# Patient Record
Sex: FEMALE | Race: WHITE | NOT HISPANIC OR LATINO | URBAN - METROPOLITAN AREA
[De-identification: names, ages, dates, MRNs, and addresses within clinical notes are randomized per-mention and may not be internally consistent; named-entity substitution may affect disease eponyms.]

---

## 2017-04-19 ENCOUNTER — OFFICE VISIT - RIVER FALLS (OUTPATIENT)
Dept: FAMILY MEDICINE | Facility: CLINIC | Age: 67
End: 2017-04-19

## 2017-04-19 ASSESSMENT — MIFFLIN-ST. JEOR: SCORE: 1450.75

## 2017-06-02 ENCOUNTER — COMMUNICATION - RIVER FALLS (OUTPATIENT)
Dept: FAMILY MEDICINE | Facility: CLINIC | Age: 67
End: 2017-06-02

## 2017-06-02 ENCOUNTER — OFFICE VISIT - RIVER FALLS (OUTPATIENT)
Dept: FAMILY MEDICINE | Facility: CLINIC | Age: 67
End: 2017-06-02

## 2017-06-02 ASSESSMENT — MIFFLIN-ST. JEOR: SCORE: 1455.29

## 2017-09-18 ENCOUNTER — AMBULATORY - RIVER FALLS (OUTPATIENT)
Dept: FAMILY MEDICINE | Facility: CLINIC | Age: 67
End: 2017-09-18

## 2018-09-20 ENCOUNTER — AMBULATORY - RIVER FALLS (OUTPATIENT)
Dept: FAMILY MEDICINE | Facility: CLINIC | Age: 68
End: 2018-09-20

## 2018-10-15 ENCOUNTER — OFFICE VISIT - RIVER FALLS (OUTPATIENT)
Dept: FAMILY MEDICINE | Facility: CLINIC | Age: 68
End: 2018-10-15

## 2018-10-29 ENCOUNTER — OFFICE VISIT - RIVER FALLS (OUTPATIENT)
Dept: FAMILY MEDICINE | Facility: CLINIC | Age: 68
End: 2018-10-29

## 2018-10-29 ASSESSMENT — MIFFLIN-ST. JEOR: SCORE: 1414.47

## 2018-11-27 ENCOUNTER — AMBULATORY - RIVER FALLS (OUTPATIENT)
Dept: FAMILY MEDICINE | Facility: CLINIC | Age: 68
End: 2018-11-27

## 2018-11-28 LAB
CHOLEST SERPL-MCNC: 202 MG/DL
CHOLEST/HDLC SERPL: 4.3 {RATIO}
HDLC SERPL-MCNC: 47 MG/DL
LDLC SERPL CALC-MCNC: 123 MG/DL
NONHDLC SERPL-MCNC: 155 MG/DL
TRIGL SERPL-MCNC: 197 MG/DL

## 2019-05-09 ENCOUNTER — OFFICE VISIT - RIVER FALLS (OUTPATIENT)
Dept: FAMILY MEDICINE | Facility: CLINIC | Age: 69
End: 2019-05-09

## 2019-05-09 ASSESSMENT — MIFFLIN-ST. JEOR: SCORE: 1419

## 2019-09-30 ENCOUNTER — AMBULATORY - RIVER FALLS (OUTPATIENT)
Dept: FAMILY MEDICINE | Facility: CLINIC | Age: 69
End: 2019-09-30

## 2020-10-14 ENCOUNTER — OFFICE VISIT - RIVER FALLS (OUTPATIENT)
Dept: FAMILY MEDICINE | Facility: CLINIC | Age: 70
End: 2020-10-14

## 2021-05-25 ENCOUNTER — RECORDS - HEALTHEAST (OUTPATIENT)
Dept: ADMINISTRATIVE | Facility: CLINIC | Age: 71
End: 2021-05-25

## 2022-02-11 VITALS
HEART RATE: 86 BPM | HEART RATE: 71 BPM | OXYGEN SATURATION: 98 % | WEIGHT: 205 LBS | TEMPERATURE: 98.4 F | SYSTOLIC BLOOD PRESSURE: 118 MMHG | DIASTOLIC BLOOD PRESSURE: 80 MMHG | HEIGHT: 65 IN | SYSTOLIC BLOOD PRESSURE: 122 MMHG | BODY MASS INDEX: 34.32 KG/M2 | DIASTOLIC BLOOD PRESSURE: 81 MMHG | HEIGHT: 65 IN | BODY MASS INDEX: 34.16 KG/M2 | WEIGHT: 206 LBS

## 2022-02-11 VITALS
DIASTOLIC BLOOD PRESSURE: 88 MMHG | OXYGEN SATURATION: 96 % | HEIGHT: 65 IN | HEART RATE: 76 BPM | SYSTOLIC BLOOD PRESSURE: 124 MMHG | BODY MASS INDEX: 32.99 KG/M2 | TEMPERATURE: 98.9 F | WEIGHT: 198 LBS

## 2022-02-11 VITALS
BODY MASS INDEX: 35.71 KG/M2 | WEIGHT: 214.6 LBS | DIASTOLIC BLOOD PRESSURE: 64 MMHG | HEART RATE: 68 BPM | SYSTOLIC BLOOD PRESSURE: 147 MMHG

## 2022-02-11 VITALS
HEART RATE: 82 BPM | WEIGHT: 197 LBS | BODY MASS INDEX: 32.82 KG/M2 | HEIGHT: 65 IN | TEMPERATURE: 98.6 F | OXYGEN SATURATION: 95 %

## 2022-02-16 NOTE — PROGRESS NOTES
Patient:   MYRIAM ANDERSEN            MRN: [id number]            FIN: [id number]               Age:   66 years     Sex:  Female     :  1950   Associated Diagnoses:   Breast cancer   Author:   Frankie Matamoros MD      Visit Information      Date of Service: 2017 08:47 am  Performing Location: [facility]  Encounter#: [id number]      Primary Care Provider (PCP):  Joaquin Fay MD    NPI# [id number]      Referring Provider:  Joaquin Fay MD    NPI# [id number]      Chief Complaint   2017 9:03 AM CDT     Patient is here for pap, pelvic, and chest wall exam.      Interval History   The patient is in today for chest wall examination.  She has DCIS and had bilateral mastectomies.  She has been advised to have semiannual chest wall examinations and is due for Pap smear.  She has had a bit of spotting with intercourse but never otherwise.  She has no other concerns.  She is up-to-date on her general screening with her colonoscopy last year and had TSH check and lipid profile.  Blood pressure has been normal on checks in the past.  Review of systems is negative.      Review of Systems   Review  of systems is negative except as documented under interval history.      Health Status   Allergies:    Allergic Reactions (Selected)  No known allergies   Medications:  (Selected)   Prescriptions  Prescribed  Imitrex 100 mg oral tablet: 1 tab(s) ( 100 mg ), po, once, # 9 tab(s), 1 Refill(s), Type: Soft Stop, Pharmacy: [facility], 1 tab(s) po once  Prevacid 30 mg oral delayed release capsule: 2 cap(s) ( 60 mg ), po, daily, # 60 EA, 11 Refill(s), Pharmacy: [facility], 2 cap(s) po daily  clindamycin 1% topical lotion: 1 haylie, TOP, BID, # 60 mL, 1 Refill(s), Type: Maintenance, Pharmacy: [facility], 1 haylie top bid  triamcinolone 0.1% topical cream: 1 haylie, TOP, TID, # 60 g, 2 Refill(s), Type: Maintenance, Pharmacy: [id number], 1 haylie top tid   Problem list:    All Problems  Cataracts, bilateral /  ICD-9-.9 / Confirmed  Kidney Stones / ICD-9-.0 / Confirmed  GERD (Gastroesophageal Reflux Disease) / ICD-9-.81 / Confirmed  Migraine / SNOMED CT 1670987100 / Confirmed  DCIS (Ductal Carcinoma in Situ) of Breast / SNOMED CT 760746432 / Confirmed  Obesity / SNOMED CT 1987633606 / Probable  Inactive: Hoarseness / ICD-9-.49  Inactive: Migraine / ICD-9-.90  Inactive: Erythema nodosum NOS / ICD-9-.2  Inactive: Insomnia / ICD-9-.52  Inactive: Hematuria / ICD-9-.70  Inactive: Obesity / ICD-9-.00  Inactive: Cervical Polyp / ICD-9-.7  Resolved: Pregnancy / SNOMED CT 495517522  Resolved: Pregnancy / SNOMED CT 114229801  Canceled: Reflux / ICD-9-.81  Canceled: gerd  Canceled: GERD  Canceled: Obesity / ICD-9-.00      Histories   Past Medical History:    Active  Cataracts, bilateral (366.9)  Kidney Stones (592.0)  GERD (Gastroesophageal Reflux Disease) (530.81)  Migraine (8506840034)  DCIS (Ductal Carcinoma in Situ) of Breast (775122384)  Resolved  Pregnancy (998854949):  Resolved on 1981 at 30 years.  Pregnancy (605127222):  Resolved on 1982 at 31 years.   Family History:    Prostate carcinoma  Brother (Howard)  Diabetes mellitus  Sister  CA - Cancer  Mother ()  Father ()  Helicobacter pylori  Mother ()  Colon polyp.  Brother (Howard)  Brother (Lui)     Procedure history:    Esophagogastroduodenoscopy and biopsy (3190284009) on 2016 at 66 Years.  Colonoscopy on 2016 at 66 Years.  Comments:  2016 8:26 AM - Whit Barcenas  Sedation:  Fentanyl, midazolam, Benadryl in divided dosages.   Impression: History of hemorrhoids with intermittent bleeding, currently inactive today.  Sigmoid diverticulosis, mild to moderate but asymptomatic.  Diminutive polyps at 70 cm, removed by cold biopsy technique.  About 8 mm polyp in the cecum, removed by hot snare techniqure.  Advanced polyp, 12 to 14 mm, of the ascending colon removed by  hot snare technique.  Family history of  polyps.    Recommendation:  Repeat in 3 years.  Mastectomy (8397913381) on 7/21/2014 at 64 Years.  Comments:  7/14/2014 8:52 BANDAR - Joaquin Fay MD  Double Mastectomy with lymph node dissection  Dr. Camarillo  [facility]  Left Breast Lumpectomy on 6/19/2014 at 64 Years.  Comments:  6/9/2014 9:45 BANDAR - Joaquin Fay MD, Dr.  [facility]  Bilateral Breast Reconstruction in 2014 at 64 Years.  Comments:  11/4/2014 9:12 BANDAR - Joaquin Fay MD, Dr.  11/13/2014  [facility] [place], [place]  DEXA - Dual energy X-ray photon absorptiometry (770504361) on 5/20/2014 at 63 Years.  Comments:  5/28/2014 10:05 AM - Favio SOLANO, Marely  Osteopenia-repeat in 2 years  Right Shoulder Arthroscopy and Rotator Cuff Repair in 2013 at 63 Years.  Right Knee Arthroscopy in 2012 at 62 Years.  Colonoscopy and extirpation of lesion of colon (950334978) on 11/30/2011 at 61 Years.  Comments:  12/7/2011 7:49 PM - Jesse Tim MD  No dysplasia/malignancy    12/5/2011 10:39 AM - Tram Soto  Rectal polyp; hyperplastic changes, benign mucosal lymphoid aggregates    11/30/2011 10:02 AM - Jesse Tim MD  3 very small rectal polyps;   diverticulosis of the lower colon.  Normal TI  Follow up in 5 years  Cataract extraction and insertion of intraocular lens (8675959021) in 2011 at 61 Years.  Colonoscopy and extirpation of lesion of colon (921584915) on 1/15/2008 at 57 Years.  Comments:  11/30/2011 9:22 AM - Jesse Tim MD  1 polyp removed;  diverticulosis   D & C, and polypectomy on 11/3/2006 at 56 Years.  Comments:  7/16/2010 10:20 AM - Kaya Baptiste  For post menopausal bleeding.  Arthroscopy of knee (531346905) in the month of 9/2006 at 56 Years.  Comments:  5/28/2013 4:39 PM - Viky Trujillo  Left  Flexible sigmoidoscopy (42916584) on 2/15/2002 at 51 Years.  Comments:  5/28/2013 4:43 PM - Viky Trujillo  Normal to 40-cm.  Ganglion cyst (601804301).  Colonoscopy (700985133).  Comments:  11/15/2011 1:16  PM - Sumeet FERMIN, Joaquin  four colonoscopies total   Social History:        Alcohol Assessment: Current            Very seldom      Tobacco Assessment: Denies Tobacco Use            Never      Substance Abuse Assessment: Denies Substance Abuse            Never      Employment and Education Assessment            Retired      Home and Environment Assessment            Marital status: .  Spouse/Partner name: Gopal.      Nutrition and Health Assessment            Type of diet: Regular.      Exercise and Physical Activity Assessment: Does not exercise      Sexual Assessment            Sexually active: Yes.  Number of current partners: 1.  Sexual orientation: Heterosexual.  Uses condoms: No.        Physical Examination   Vital Signs   6/2/2017 9:03 AM CDT Peripheral Pulse Rate 71 bpm    HR Method Electronic    Systolic Blood Pressure 118 mmHg    Diastolic Blood Pressure 81 mmHg    Mean Arterial Pressure 93 mmHg    BP Site Right arm    BP Method Electronic      Gastrointestinal:       Abdomen: Abdomen is soft and nontender without masses..    Gynecologic:  The vagina appears healthy; although, minimally atrophic.  There is perhaps a bit of redness from the vaginal epithelium but no blood from the cervix.  Bimanual examination finds tiny uterus with no pelvic masses or tenderness..         Breast: Surgically altered ( Chest wall examination finds no skin changes or lumps.  She has bilateral implants with no abnormalities noted.   ).       Impression and Plan   Diagnosis     Breast cancer ([id number] C50.919).     Minor spotting from atrophic change.  No evidence of disease otherwise..     Plan:  The patient was reassured.  She will return to us p.r.n. and continue with her required six month chest wall examination..    Patient Instructions:       Counseled: Patient, Regarding diagnosis, Regarding treatment, Verbalized understanding.

## 2022-02-16 NOTE — TELEPHONE ENCOUNTER
---------------------  From: Bernadine Moser CMA   To: Joaquin Fay MD;     Sent: 7/8/2019 8:49:18 AM CDT  Subject: CT order request     Pt called to request that Pulmonary CT order be sent to [facility] [place]  so that she can schedule for Aug.

## 2022-02-16 NOTE — NURSING NOTE
Pt called to say that med given for pain by BRITTA in Oct.. really works well and she wanted it noted in her chart... upon chart review, the med is celebrex

## 2022-02-16 NOTE — TELEPHONE ENCOUNTER
---------------------  From: Bernadine Moser CMA   To: Joaquin Fay MD;     Sent: 1/2/2019 10:36:49 AM CST  Subject: General Message     Phone Message    PCP:   BR[facility]TA      Time of Call:  _       Person Calling:  Monik  Phone number:  [phone]    Returned call at: _    Note:   Pt called to say that she was in at the end of Oct for cough... she is now in [place] and the cough has returned and is coughing up green phlegm.  She has been taking Mucinex without relief.. she is wondering if she can have a rx sent to [facility]    Last office visit and reason:  _    Transferred to: BRITTA---------------------  From: Joaquin Fay MD   To: Bernadine Moser CMA;     Sent: 1/2/2019 12:22:18 PM CST  Subject: RE: General Message     eRx sentPt contacted that rx is ready for  at the pharmacy

## 2022-02-16 NOTE — TELEPHONE ENCOUNTER
MYRIAM ANDERSEN  : 1950  PHONE NOTE  The patient was given the results of her chest CT done on 2019.  There was a new 2 mm pulmonary nodule on the left upper lobe.  The nodule she previously had on the right lower lobe had apparently resolved.  I told her that she didn t need to do any further imaging.  However, if she wanted to follow this for one more year that would be reasonable.  D:  2019  T:  2019  Joaquin Fay MD/michele

## 2022-02-16 NOTE — NURSING NOTE
Comprehensive Intake Entered On:  10/14/2020 3:21 PM CDT    Performed On:  10/14/2020 3:14 PM CDT by Talia Gonzalez CMA               Summary   Chief Complaint :   PREOP: DOS 11/5/2020    Menstrual Status :   Postmenopausal   Weight Measured :   214.6 lb(Converted to: 214 lb 10 oz, 97.341 kg)    Systolic Blood Pressure :   147 mmHg (HI)    Diastolic Blood Pressure :   64 mmHg   Mean Arterial Pressure :   92 mmHg   Peripheral Pulse Rate :   68 bpm   BP Method :   Electronic   HR Method :   Electronic   Race :      Languages :   English   Ethnicity :   Not  or    Talia Gonzalez CMA - 10/14/2020 3:14 PM CDT   Health Status   Allergies Verified? :   Yes   Medication History Verified? :   Yes   Immunizations Current :   Yes   Medical History Verified? :   Yes   Tobacco Use? :   Never smoker   Talia Gonzalez CMA - 10/14/2020 3:14 PM CDT   Consents   Consent for Immunization Exchange :   Consent Granted   Consent for Immunizations to Providers :   Consent Granted   Talia Gonzalez CMA - 10/14/2020 3:14 PM CDT   Meds / Allergies   (As Of: 10/14/2020 3:21:19 PM CDT)   Allergies (Active)   No known allergies  Estimated Onset Date:   Unspecified ; Created By:   Kaya Baptiste; Reaction Status:   Active ; Category:   Drug ; Substance:   No known allergies ; Type:   Allergy ; Updated By:   Kaya Baptiste; Reviewed Date:   5/9/2019 8:53 AM CDT        Medication List   (As Of: 10/14/2020 3:21:19 PM CDT)   Prescription/Discharge Order    celecoxib  :   celecoxib ; Status:   Prescribed ; Ordered As Mnemonic:   celecoxib 200 mg oral capsule ; Simple Display Line:   1 cap(s), Oral, daily, PRN: AS NEEDED FOR PAIN, 90 cap(s), 1 Refill(s) ; Ordering Provider:   Joaquin Fay MD; Catalog Code:   celecoxib ; Order Dt/Tm:   6/16/2020 11:27:19 AM CDT            Home Meds    bifidobacterium-lactobacillus  :   bifidobacterium-lactobacillus ; Status:   Documented ; Ordered As Mnemonic:   "[employer] ; Simple Display  Line:   Oral, daily, 0 Refill(s) ; Catalog Code:   bifidobacterium-lactobacillus ; Order Dt/Tm:   10/14/2020 3:20:24 PM CDT          cholecalciferol  :   cholecalciferol ; Status:   Documented ; Ordered As Mnemonic:   Vitamin D3 2000 intl units oral tablet ; Simple Display Line:   50 mcg, Oral, daily, 0 Refill(s) ; Catalog Code:   cholecalciferol ; Order Dt/Tm:   10/14/2020 3:18:40 PM CDT          loratadine  :   loratadine ; Status:   Documented ; Ordered As Mnemonic:   loratadine 10 mg oral tablet ; Simple Display Line:   10 mg, 1 tab(s), Oral, daily, 0 Refill(s) ; Catalog Code:   loratadine ; Order Dt/Tm:   10/14/2020 3:19:25 PM CDT          methylcellulose  :   methylcellulose ; Status:   Documented ; Ordered As Mnemonic:   Citrucel 500 mg oral tablet ; Simple Display Line:   1,000 mg, 2 tab(s), Oral, daily, 0 Refill(s) ; Catalog Code:   methylcellulose ; Order Dt/Tm:   10/14/2020 3:19:57 PM CDT          omega-3 polyunsaturated fatty acids  :   omega-3 polyunsaturated fatty acids ; Status:   Documented ; Ordered As Mnemonic:   Fish Oil ; Simple Display Line:   Oral, 0 Refill(s) ; Catalog Code:   omega-3 polyunsaturated fatty acids ; Order Dt/Tm:   10/14/2020 3:19:05 PM CDT          pantoprazole  :   pantoprazole ; Status:   Documented ; Ordered As Mnemonic:   pantoprazole 40 mg oral delayed release tablet ; Simple Display Line:   40 mg, 1 tab(s), PO, Daily, 90 tab(s), 0 Refill(s) ; Catalog Code:   pantoprazole ; Order Dt/Tm:   10/29/2018 10:42:10 AM CDT            ID Risk Screen   Recent Travel History :   No recent travel   Family Member Travel History :   No recent travel   Other Exposure to Infectious Disease :   Unknown   Talia Gonzalez CMA - 10/14/2020 3:14 PM CDT

## 2022-02-16 NOTE — PROGRESS NOTES
Patient:   MYRIAM ANDERSEN            MRN: [id number]            FIN: [id number]               Age:   68 years     Sex:  Female     :  1950   Associated Diagnoses:   Lower respiratory infection   Author:   Joaquin Fay MD      Impression and Plan   Diagnosis     Lower respiratory infection ([id number] J22).     Course:  Worsening.    Orders     Orders   Charges (Evaluation and Management):  13144 office outpatient visit 15 minutes (Charge) (Order): Quantity: 1, Lower respiratory infection.     Orders (Selected)   Prescriptions  Prescribed  azithromycin 500 mg oral tablet: = 1 tab(s) ( 500 mg ), PO, Daily, # 7 tab(s), 0 Refill(s), Type: Maintenance, Pharmacy: [facility], 1 tab(s) Oral daily,x7 day(s)  benzonatate 200 mg oral capsule: = 1 cap(s) ( 200 mg ), PO, TID, # 21 cap(s), 0 Refill(s), Type: Maintenance, Pharmacy: [facility], 1 cap(s) Oral tid,x7 day(s).        Visit Information      Date of Service: 2019 08:47 am  Performing Location: [facility]  Encounter#: [id number]      Primary Care Provider (PCP):  Joaquin Fay MD    NPI# [id number]   Visit type:  New symptom.    Accompanied by:  No one.    Source of history:  Self.    History limitation:  None.       Chief Complaint   Chief complaint discussed and confirmed correct.     2019 8:50 AM CDT     pt here for cough x 1 week        History of Present Illness             The patient presents with cough.  The cough is described as paroxysmal and productive yellow sputum.  The severity of the cough is severe.  The cough is worsening.  The cough has lasted for 1 week(s).  The context of the cough: occurred in association with illness.  There are no modifying factors.  Associated symptoms consist of none.        Review of Systems   Constitutional:  Negative.    Eye:  Negative.    Ear/Nose/Mouth/Throat:  Negative.    Respiratory:  Cough.    Cardiovascular:  Negative.    Gastrointestinal:  Negative.    Genitourinary:  Negative.     Hematology/Lymphatics:  Negative.    Endocrine:  Negative.    Immunologic:  Negative.    Musculoskeletal:  Negative.    Integumentary:  Negative.    Neurologic:  Negative.    Psychiatric:  Negative.    All other systems reviewed and negative      Health Status   Allergies:    Allergic Reactions (Selected)  No known allergies   Medications:  (Selected)   Prescriptions  Prescribed  Imitrex 100 mg oral tablet: 1 tab(s) ( 100 mg ), po, once, # 9 tab(s), 1 Refill(s), Type: Soft Stop, Pharmacy: [facility], 1 tab(s) po once  azithromycin 500 mg oral tablet: = 1 tab(s) ( 500 mg ), PO, Daily, # 7 tab(s), 0 Refill(s), Type: Maintenance, Pharmacy: [facility], 1 tab(s) Oral daily,x7 day(s)  benzonatate 200 mg oral capsule: = 1 cap(s) ( 200 mg ), PO, TID, # 21 cap(s), 0 Refill(s), Type: Maintenance, Pharmacy: [facility], 1 cap(s) Oral tid,x7 day(s)  Documented Medications  Documented  latanoprost ophthalmic: qpm, 0 Refill(s), Type: Maintenance  pantoprazole 40 mg oral delayed release tablet: = 1 tab(s) ( 40 mg ), PO, Daily, # 90 tab(s), 0 Refill(s), Type: Maintenance   Problem list:    All Problems  Cataracts, bilateral / ICD-9-.9 / Confirmed  DCIS (Ductal Carcinoma in Situ) of Breast / SNOMED CT 680801749 / Confirmed  GERD (Gastroesophageal Reflux Disease) / ICD-9-.81 / Confirmed  Kidney Stones / ICD-9-.0 / Confirmed  Migraine / SNOMED CT 8826205042 / Confirmed  Obesity / SNOMED CT 7213737949 / Probable  Inactive: Cervical Polyp / ICD-9-.7  Inactive: Erythema nodosum NOS / ICD-9-.2  Inactive: Hematuria / ICD-9-.70  Inactive: Hoarseness / ICD-9-.49  Inactive: Insomnia / ICD-9-.52  Inactive: Migraine / ICD-9-.90  Inactive: Obesity / ICD-9-.00  Resolved: Pregnancy / SNOMED CT 885849245  Resolved: Pregnancy / SNOMED CT 029042635  Canceled: gerd  Canceled: GERD  Canceled: Obesity / ICD-9-.00  Canceled: Reflux / ICD-9-.81      Histories   Past  Medical History:    Active  Cataracts, bilateral (366.9)  Kidney Stones (592.0)  GERD (Gastroesophageal Reflux Disease) (530.81)  Migraine (1652898122)  DCIS (Ductal Carcinoma in Situ) of Breast (718232317)  Resolved  Pregnancy (161234322):  Resolved on 1981 at 30 years.  Pregnancy (809926450):  Resolved on 1982 at 31 years.   Family History:    Prostate carcinoma  Brother (Howard)  Diabetes mellitus  Sister  CA - Cancer  Mother ()  Father ()  Helicobacter pylori  Mother ()  Colon polyp.  Brother (Howard)  Brother (Lui)     Procedure history:    Esophagogastroduodenoscopy and biopsy (SNOMED CT 6959791421) performed by Jesse Tim MD on 2016 at 66 Years.  Colonoscopy performed by Jesse Tim MD on 2016 at 66 Years.  Comments:  2016 8:26 AM Whit Dsouza  Sedation:  Fentanyl, midazolam, Benadryl in divided dosages.   Impression: History of hemorrhoids with intermittent bleeding, currently inactive today.  Sigmoid diverticulosis, mild to moderate but asymptomatic.  Diminutive polyps at 70 cm, removed by cold biopsy technique.  About 8 mm polyp in the cecum, removed by hot snare techniqure.  Advanced polyp, 12 to 14 mm, of the ascending colon removed by hot snare technique.  Family history of  polyps.    Recommendation:  Repeat in 3 years.  Mastectomy (SNOMED CT 2845850819) performed by Martha Camarillo MD on 2014 at 64 Years.  Comments:  2014 8:52 AM Joaquin Schulte MD  Double Mastectomy with lymph node dissection  Dr. Camarillo  [facility]  Left Breast Lumpectomy performed by Frankie Rivera on 2014 at 64 Years.  Comments:  2014 9:45 AM Joaquin Schulte MD, Dr.  [facility]  Bilateral Breast Reconstruction in  at 64 Years.  Comments:  2014 9:12 AM Joaquin Mena MD, Dr.  2014  [facility] [place]  DEXA - Dual energy X-ray photon absorptiometry (SNOMED CT 804441060) performed by Dani Javed DO on  5/20/2014 at 63 Years.  Comments:  5/28/2014 10:05 AM CDT - Favio SOLANO, Marely  Osteopenia-repeat in 2 years  Right Shoulder Arthroscopy and Rotator Cuff Repair in 2013 at 63 Years.  Right Knee Arthroscopy in 2012 at 62 Years.  Colonoscopy and extirpation of lesion of colon (SNOMED CT 133719555) performed by Jesse Tim MD on 11/30/2011 at 61 Years.  Comments:  12/7/2011 7:49 PM Jesse Suarez MD  No dysplasia/malignancy    12/5/2011 10:39 AM CST - Tram Soto  Rectal polyp; hyperplastic changes, benign mucosal lymphoid aggregates    11/30/2011 10:02 AM Jesse Suarez MD  3 very small rectal polyps;   diverticulosis of the lower colon.  Normal TI  Follow up in 5 years  Cataract extraction and insertion of intraocular lens (SNOMED CT 4075483069) in 2011 at 61 Years.  Colonoscopy and extirpation of lesion of colon (SNOMED CT 753861422) performed by Jesse Tim MD on 1/15/2008 at 57 Years.  Comments:  11/30/2011 9:22 AM Jesse Suarez MD  1 polyp removed;  diverticulosis   D & C, and polypectomy on 11/3/2006 at 56 Years.  Comments:  7/16/2010 10:20 AM MADELINET - Kaya Baptiste  For post menopausal bleeding.  Arthroscopy of knee (SNOMED CT 083512930) in the month of 9/2006 at 56 Years.  Comments:  5/28/2013 4:39 PM CDT - Viky Trujillo  Left  Flexible sigmoidoscopy (SNOMED CT 91363972) performed by Joaquin Fay MD on 2/15/2002 at 51 Years.  Comments:  5/28/2013 4:43 PM CDT - Viky Trujillo  Normal to 40-cm.  Ganglion cyst (SNOMED CT 639047995).  Colonoscopy (SNOMED CT 184276802).  Comments:  11/15/2011 1:16 PM Joaquin Mena MD  four colonoscopies total   Social History:        Alcohol Assessment: Current            Very seldom      Tobacco Assessment: Denies Tobacco Use            Never      Substance Abuse Assessment: Denies Substance Abuse            Never      Employment and Education Assessment            Retired      Home and Environment Assessment            Marital status: .   Spouse/Partner name: Gopal.      Nutrition and Health Assessment            Type of diet: Regular.      Exercise and Physical Activity Assessment: Does not exercise      Sexual Assessment            Sexually active: Yes.  Number of current partners: 1.  Sexual orientation: Heterosexual.  Uses condoms: No.      Physical Examination   Vital signs reviewed  and within acceptable limits    Vital Signs   5/9/2019 8:50 AM CDT Temperature Tympanic 98.9 DegF    Peripheral Pulse Rate 76 bpm    HR Method Manual    Systolic Blood Pressure 124 mmHg    Diastolic Blood Pressure 88 mmHg  HI    Mean Arterial Pressure 100 mmHg    BP Site Left arm    BP Method Manual    Oxygen Saturation 96 %      Measurements from flowsheet : Measurements   5/9/2019 8:50 AM CDT Height Measured - Standard 65 in    Weight Measured - Standard 198 lb    BSA 2.03 m2    Body Mass Index 32.95 kg/m2  HI      General:  Alert and oriented, No acute distress.    Eye:  Pupils are equal, round and reactive to light, Extraocular movements are intact, Normal conjunctiva.    HENT:  Normocephalic, Tympanic membranes are clear, Normal hearing, Oral mucosa is moist, No pharyngeal erythema, No sinus tenderness.    Neck:  Supple, Non-tender, No lymphadenopathy, No thyromegaly.    Respiratory:  Lungs are clear to auscultation, Respirations are non-labored, Breath sounds are equal, demonstrates frequent loose cough.    Cardiovascular:  Normal rate, Regular rhythm, No murmur, Normal peripheral perfusion, No edema.    Integumentary:  Warm, Dry, Pink.    Psychiatric:  Cooperative, Appropriate mood & affect, Normal judgment.

## 2022-02-16 NOTE — TELEPHONE ENCOUNTER
Entered by Karina Fink CMA on May 12, 2021 6:22:35 AM CDT  ---------------------  From: Karina Fink CMA   To: [facility]    Sent: 5/12/2021 6:22:35 AM CDT  Subject: Medication Management     ** Not Approved: Patient has requested refill too soon, #90, 3 sent 11/19/20 **  celecoxib (CELECOXIB 200 MG CAPS 200 Capsule)  TAKE 1 CAPSULE BY MOUTH ONCE DAILY AS NEEDED FOR PAIN  Qty:  90 cap(s)        Days Supply:  90        Refills:  3          Substitutions Allowed     Route To Pharmacy - [facility]   Signed by Karina Fink CMA            ------------------------------------------  From: [facility]  To: Joaquin Fay MD  Sent: May 11, 2021 2:21:11 PM CDT  Subject: Medication Management  Due: April 27, 2021 12:41:41 AM CDT     ** On Hold Pending Signature **     Drug: celecoxib (celecoxib 200 mg oral capsule), 1 cap(s) Oral daily,PRN:AS NEEDED FOR PAIN  Quantity: 90 cap(s)  Days Supply: 0  Refills: 3  Substitutions Allowed  Notes from Pharmacy:     Dispensed Drug: celecoxib (celecoxib 200 mg oral capsule), TAKE 1 CAPSULE BY MOUTH ONCE DAILY AS NEEDED FOR PAIN  Quantity: 90 cap(s)  Days Supply: 90  Refills: 3  Substitutions Allowed  Notes from Pharmacy:  ------------------------------------------

## 2022-02-16 NOTE — PROGRESS NOTES
Patient:   MYRIAM ANDERSEN            MRN: [id number]            FIN: [id number]               Age:   68 years     Sex:  Female     :  1950   Associated Diagnoses:   Acute bronchitis; Acute cystitis   Author:   Joaquin Fay MD      Impression and Plan   Diagnosis     Acute bronchitis (SPI32-YF J20.9).     Course:  Worsening.    Orders     Orders   Charges (Evaluation and Management):  22369 office outpatient visit 15 minutes (Charge) (Order): Quantity: 1, Acute bronchitis  Acute cystitis.     Orders (Selected)   Prescriptions  Prescribed  Levaquin 500 mg oral tablet: = 1 tab(s) ( 500 mg ), PO, q24hr, # 10 tab(s), 0 Refill(s), Type: Maintenance, Pharmacy: [facility], 1 tab(s) Oral q 24 hrs,x10 day(s).     Diagnosis     Acute cystitis (PUL44-SA N30.00).     Course:  Worsening.    Orders     Orders (Selected)   Outpatient Orders  Ordered  Urinalysis (Request): Acute cystitis.        Visit Information      Date of Service: 10/29/2018 10:34 am  Performing Location: [facility]  Encounter#: [id number]      Primary Care Provider (PCP):  Joaquin Fay MD    NPI# [id number]   Visit type:  New symptom.    Accompanied by:  No one.    Source of history:  Self.    History limitation:  None.       Chief Complaint   10/29/2018 10:38 AM CDT  Pt here for cough and urinary urgency        History of Present Illness             The patient presents with cough.  The cough is described as paroxysmal and productive yellow sputum.  The severity of the cough is severe.  The cough is worsening.  The cough has lasted for 1 week(s).  The context of the cough: occurred in association with illness.  There are no modifying factors.  Associated symptoms consist of none.               The patient presents with dysuria.  The dysuria is characterized by burning.  The severity of the dysuria is severe.  The dysuria is constant.  The dysuria has lasted for 1 week(s).  There are no modifying factors.  Associated symptoms consist  of none.        Review of Systems   Constitutional:  Negative.    Eye:  Negative.    Ear/Nose/Mouth/Throat:  Negative.    Respiratory:  Negative.    Cardiovascular:  Negative.    Gastrointestinal:  Negative.    Genitourinary:  Negative.    Hematology/Lymphatics:  Negative.    Endocrine:  Negative.    Immunologic:  Negative.    Musculoskeletal:  Negative.    Integumentary:  Negative.    Neurologic:  Negative.    Psychiatric:  Negative.    All other systems reviewed and negative      Health Status   Allergies:    Allergic Reactions (Selected)  No known allergies   Medications:  (Selected)   Prescriptions  Prescribed  Imitrex 100 mg oral tablet: 1 tab(s) ( 100 mg ), po, once, # 9 tab(s), 1 Refill(s), Type: Soft Stop, Pharmacy: [facility], 1 tab(s) po once  Levaquin 500 mg oral tablet: = 1 tab(s) ( 500 mg ), PO, q24hr, # 10 tab(s), 0 Refill(s), Type: Maintenance, Pharmacy: [facility], 1 tab(s) Oral q 24 hrs,x10 day(s)  Documented Medications  Documented  latanoprost ophthalmic: qpm, 0 Refill(s), Type: Maintenance  pantoprazole 40 mg oral delayed release tablet: = 1 tab(s) ( 40 mg ), PO, Daily, # 90 tab(s), 0 Refill(s), Type: Maintenance   Problem list:    All Problems (Selected)  Cataracts, bilateral / ICD-9-.9 / Confirmed  DCIS (Ductal Carcinoma in Situ) of Breast / SNOMED CT 098775974 / Confirmed  GERD (Gastroesophageal Reflux Disease) / ICD-9-.81 / Confirmed  Kidney Stones / ICD-9-.0 / Confirmed  Migraine / SNOMED CT 7647158578 / Confirmed  Obesity / SNOMED CT 1405499829 / Probable      Histories   Past Medical History:    Active  Cataracts, bilateral (366.9)  Kidney Stones (592.0)  GERD (Gastroesophageal Reflux Disease) (530.81)  Migraine (2031349192)  DCIS (Ductal Carcinoma in Situ) of Breast (755615815)  Resolved  Pregnancy (496026319):  Resolved on 2/6/1981 at 30 years.  Pregnancy (977997400):  Resolved on 6/6/1982 at 31 years.   Family History:    Prostate carcinoma  Brother  (Howard)  Diabetes mellitus  Sister  CA - Cancer  Mother ()  Father ()  Helicobacter pylori  Mother ()  Colon polyp.  Brother (Howard)  Brother (Lui)     Procedure history:    Esophagogastroduodenoscopy and biopsy (SNOMED CT 0664964573) performed by Jesse Tim MD on 2016 at 66 Years.  Colonoscopy performed by Jesse Tim MD on 2016 at 66 Years.  Comments:  2016 8:26 AM - Whit Barcenas  Sedation:  Fentanyl, midazolam, Benadryl in divided dosages.   Impression: History of hemorrhoids with intermittent bleeding, currently inactive today.  Sigmoid diverticulosis, mild to moderate but asymptomatic.  Diminutive polyps at 70 cm, removed by cold biopsy technique.  About 8 mm polyp in the cecum, removed by hot snare techniqure.  Advanced polyp, 12 to 14 mm, of the ascending colon removed by hot snare technique.  Family history of  polyps.    Recommendation:  Repeat in 3 years.  Mastectomy (SNOMED CT 1562774390) performed by Marquise FERMIN [facility] on 2014 at 64 Years.  Comments:  2014 8:52 Joaquin hSultz MD  Double Mastectomy with lymph node dissection  Dr. Camarillo  [facility]  Left Breast Lumpectomy performed by Franike Rivera on 2014 at 64 Years.  Comments:  2014 9:45 Joaquin Shultz MD, Dr.  [facility]  Bilateral Breast Reconstruction in  at 64 Years.  Comments:  2014 9:12 AM - Joaquin Fay MD, Dr.  2014  [facility]  DEXA - Dual energy X-ray photon absorptiometry (SNOMED CT 912892352) performed by Dani Javed DO on 2014 at 63 Years.  Comments:  2014 10:05 AM - Favio SOLANO, Marely  Osteopenia-repeat in 2 years  Right Shoulder Arthroscopy and Rotator Cuff Repair in  at 63 Years.  Right Knee Arthroscopy in  at 62 Years.  Colonoscopy and extirpation of lesion of colon (SNOMED CT 972533043) performed by Jesse Tim MD on 2011 at 61 Years.  Comments:  2011 7:49 PM - Meeta FERMIN  Jesse  No dysplasia/malignancy    12/5/2011 10:39 AM - Tram Soto  Rectal polyp; hyperplastic changes, benign mucosal lymphoid aggregates    11/30/2011 10:02 AM - Jesse Tim MD  3 very small rectal polyps;   diverticulosis of the lower colon.  Normal TI  Follow up in 5 years  Cataract extraction and insertion of intraocular lens (SNOMED CT 6211920809) in 2011 at 61 Years.  Colonoscopy and extirpation of lesion of colon (SNOMED CT 597592573) performed by Jesse Tim MD on 1/15/2008 at 57 Years.  Comments:  11/30/2011 9:22 AM - Jesse Tim MD  1 polyp removed;  diverticulosis   D & C, and polypectomy on 11/3/2006 at 56 Years.  Comments:  7/16/2010 10:20 AM - Kaya Baptiste  For post menopausal bleeding.  Arthroscopy of knee (SNOMED CT 514840128) in the month of 9/2006 at 56 Years.  Comments:  5/28/2013 4:39 PM - Viky Trjuillo  Left  Flexible sigmoidoscopy (SNOMED CT 29726100) performed by Joaquin Fay MD on 2/15/2002 at 51 Years.  Comments:  5/28/2013 4:43 PM - Viky Trujillo  Normal to 40-cm.  Ganglion cyst (SNOMED CT 830682348).  Colonoscopy (SNOMED CT 752167016).  Comments:  11/15/2011 1:16 PM - Joaquin Fay MD  four colonoscopies total   Social History:        Alcohol Assessment: Current            Very seldom      Tobacco Assessment: Denies Tobacco Use            Never      Substance Abuse Assessment: Denies Substance Abuse            Never      Employment and Education Assessment            Retired      Home and Environment Assessment            Marital status: .  Spouse/Partner name: Gopal.      Nutrition and Health Assessment            Type of diet: Regular.      Exercise and Physical Activity Assessment: Does not exercise      Sexual Assessment            Sexually active: Yes.  Number of current partners: 1.  Sexual orientation: Heterosexual.  Uses condoms: No.        Physical Examination   Vital Signs   10/29/2018 10:38 AM CDT Temperature Tympanic 98.6 DegF    Peripheral Pulse Rate  82 bpm    Oxygen Saturation 95 %      Measurements from flowsheet : Measurements   10/29/2018 10:38 AM CDT Height Measured - Standard 65 in    Weight Measured - Standard 197 lb    BSA 2.02 m2    Body Mass Index 32.78 kg/m2  HI      General:  No acute distress.    Respiratory:  Lungs are clear to auscultation, Respirations are non-labored, Breath sounds are equal, Symmetrical chest wall expansion.    Cardiovascular:  Normal rate, Regular rhythm, No murmur, No gallop, Good pulses equal in all extremities, Normal peripheral perfusion, No edema.    Gastrointestinal:  Soft, Non-tender, Non-distended, Normal bowel sounds, No organomegaly.    Genitourinary:  No costovertebral angle tenderness.    Integumentary:  Warm, Dry, Pink.    Neurologic:  Alert, Oriented.    Psychiatric:  Cooperative, Appropriate mood & affect.       Review / Management   Results review:  UA: Positive LE, HEME, NITRITE.

## 2022-02-16 NOTE — TELEPHONE ENCOUNTER
---------------------  From: Keri Wray (Unit 2 Pool ([address]) )   To: BRITTA Nguyen Sullivan ([address])   ;     Sent: 2/23/2021 9:53:58 AM CST  Subject: Clindamycin        PCP:   Sumeet      Time of Call:  0851       Person Calling:  pt  Phone number:  [phone]    Returned call at: 0933    Note:   pt called asking for a refill on clindamycin lotion or solution for acne. Pt states that a while back Dr Fay has prescribed this for her. She is wondering if she can get a refill of this med sent to [facility] in [facility] [phone].    Keri RN    Last office visit and reason:  _pt notified that refill has been sent

## 2022-02-16 NOTE — TELEPHONE ENCOUNTER
Entered by Bernadine Moser CMA on November 21, 2019 1:51:07 PM CST  ---------------------  From: Bernadine Moser CMA   To: [facility]    Sent: 11/21/2019 1:51:07 PM CST  Subject: Medication Management     ** Submitted: **  Order:celecoxib (celecoxib 200 mg oral capsule)  1 cap(s)  Oral  daily  Qty:  30 cap(s)        Days Supply:  30        Refills:  5          Substitutions Allowed     PRN  AS NEEDED FOR PAIN      Route To [facility]    Signed by Bernadine Moser CMA  11/21/2019 1:50:00 PM    ** Submitted: **  Complete:celecoxib (CeleBREX 200 mg oral capsule)   Signed by Bernadine Moser CMA  11/21/2019 1:51:00 PM    ** Not Approved:  **  celecoxib (CELECOXIB 200 MG CAPS 200 CAP)  TAKE 1 CAPSULE BY MOUTH ONCE DAILY AS NEEDED FOR PAIN  Qty:  30 cap(s)        Days Supply:  30        Refills:  1          Substitutions Allowed     Route To [facility]   Signed by Bernadine Moser CMA            ------------------------------------------  From: [facility]  To: Joaquin Fay MD  Sent: November 21, 2019 1:19:45 PM CST  Subject: Medication Management  Due: November 22, 2019 1:19:45 PM CST    ** On Hold Pending Signature **  Drug: celecoxib (CeleBREX 200 mg oral capsule)  1 cap(s) Oral daily,PRN:as needed for pain  Quantity: 30 cap(s)  Days Supply: 0  Refills: 0  Substitutions Allowed  Notes from Pharmacy:     Dispensed Drug: celecoxib (celecoxib 200 mg oral capsule)  TAKE 1 CAPSULE BY MOUTH ONCE DAILY AS NEEDED FOR PAIN  Quantity: 30 cap(s)  Days Supply: 30  Refills: 1  Substitutions Allowed  Notes from Pharmacy:   ------------------------------------------

## 2022-02-16 NOTE — TELEPHONE ENCOUNTER
Entered by Bernadine Moser CMA on November 19, 2020 12:19:03 PM CST  ---------------------  From: Bernadine Moser CMA   To: [facility]    Sent: 11/19/2020 12:19:03 PM CST  Subject: Medication Management     ** Submitted: **  Order:celecoxib (celecoxib 200 mg oral capsule)  1 cap(s)  Oral  daily  Qty:  90 cap(s)        Days Supply:  90        Refills:  3          Substitutions Allowed     PRN  AS NEEDED FOR PAIN      Route To [facility]    Signed by Bernadine Moser CMA  11/19/2020 6:18:00 PM [facility]    ** Submitted: **  Complete:celecoxib (celecoxib 200 mg oral capsule)   Signed by Bernadine Moser CMA  11/19/2020 6:19:00 PM [facility]    ** Not Approved:  **  celecoxib (CELECOXIB 200 MG CAPS 200 Capsule)  TAKE 1 CAPSULE BY MOUTH ONCE DAILY AS NEEDED FOR PAIN  Qty:  90 cap(s)        Days Supply:  90        Refills:  11          Substitutions Allowed     Route To [facility]   Signed by Bernadine Moser CMA            ------------------------------------------  From: [facility]  To: Joaquin Fay MD  Sent: November 19, 2020 8:38:09 AM CST  Subject: Medication Management  Due: November 7, 2020 12:21:51 AM CST     ** On Hold Pending Signature **     Drug: celecoxib (celecoxib 200 mg oral capsule), 1 cap(s) Oral daily,PRN:AS NEEDED FOR PAIN  Quantity: 90 cap(s)  Days Supply: 0  Refills: 0  Substitutions Allowed  Notes from Pharmacy:     Dispensed Drug: celecoxib (celecoxib 200 mg oral capsule), TAKE 1 CAPSULE BY MOUTH ONCE DAILY AS NEEDED FOR PAIN  Quantity: 90 cap(s)  Days Supply: 90  Refills: 11  Substitutions Allowed  Notes from Pharmacy:  ------------------------------------------

## 2022-02-16 NOTE — TELEPHONE ENCOUNTER
---------------------  From: Krysta Steele   To: Bernadine Moser CMA;     Sent: 6/16/2020 11:23:06 AM CDT  Subject: General Message     pt left message for med refill - celecoxib 200mg   [facility]---------------------  From: Bernadine Moser CMA   To: Krysta Steele;     Sent: 6/16/2020 11:27:55 AM CDT  Subject: RE: General Message     refill sent to pharmacy---------------------  From: Krysta Steele   To: Bernadine Moser CMA;     Sent: 6/16/2020 11:32:00 AM CDT  Subject: RE: General Message     pt notified

## 2022-02-16 NOTE — PROGRESS NOTES
Patient:   MYRIAM ANDERSEN            MRN: [id number]            FIN: [id number]               Age:   70 years     Sex:  Female     :  1950   Associated Diagnoses:   None   Author:   Tho Duggan MD      Procedure   EKG procedure   Date:  10/14/2020.     Confirmed: patient.     Performed by: nurse.     Indication: PreOp.     Position: supine.     EKG findings   Interpretation: Tho Duggan MD.     Prior EKG: from  .     Rhythm: heart rate  98  beats/min.     Axis: normal axis, normal configuration.     P waves: Negative V1.     QRS complex: normal.     ST-T-U complex: normal.     Interpretation: Possible LAE, LVH New since , Abnormal EKG.

## 2022-02-16 NOTE — TELEPHONE ENCOUNTER
---------------------  From: Akua Rodriguez LPN (Phone Messages Pool (32224_[facility]))   Sent: 9/18/2020 11:52:40 AM CDT  Subject: flu shot     Phone Message    HOLLEY:   BRITTA      Time of Call:  11:06am       Person Calling:  pt  Phone number:  [phone]    Returned call at: 11:44am    Note:   Pt LM asking for call back.    Returned call and pt asking if flu shots are in yet/ wonders about scheduling. Told pt we do have flu shots and she could schedule a nurse visit to do that.     Last office visit and reason:  5/9/19 Cough Adult

## 2022-02-16 NOTE — TELEPHONE ENCOUNTER
---------------------  From: Bernadine Moser CMA   To: Joaquin Fay MD;     Sent: 4/1/2020 1:33:02 PM CDT  Subject: General Message     Phone Message    Note:   pt left voicemail stating that she has had a dry cough x 3 weeks.. no other sx and has stayed indoors at home since getting back from [place]... she has been using Robitussin without relief... she is feeling like it is more chest congestion that anything else... she is wanting to know if something can be prescribed          Person Calling:  Monik  Phone number:  [phone]        PCP:   _    Time of Call:  _    Last office visit and reason:  _    Transferred to: _---------------------  From: Joaquin Fay MD   To: Bernadine Moser CMA;     Sent: 4/1/2020 3:22:43 PM CDT  Subject: RE: General Message     eRx senttried to reach pt without success to let her know that an rx has been sent to her pharmacy.

## 2022-02-16 NOTE — PROCEDURES
Accession Number:       [id number]  CLINICAL INFORMATION::     Postmenopausal  LMP::     NONE GIVEN  PREV. PAP::     2014  PREV. BX::     N/A  SOURCE::     Cervix  STATEMENT OF ADEQUACY::     Satisfactory for evaluation. Endocervical/transformation zone component present.  INTERPRETATION/RESULT::     Negative for intraepithelial lesion or malignancy.  COMMENT::     This Pap test has been evaluated with computer assisted technology.  CYTOTECHNOLOGIST::     EPB CT(ASCP) CT Screening location: [place] [id number]

## 2022-02-16 NOTE — PROGRESS NOTES
Patient:   MYRIAM ANDERSEN            MRN: [id number]            FIN: [id number]               Age:   70 years     Sex:  Female     :  1950   Associated Diagnoses:   Preop examination; Migraine; Osteoarthritis of left knee; Pre-op exam; GERD (Gastroesophageal Reflux Disease)   Author:   Vasquez Taylor PA-C      Preoperative Information   .Intractable left knee pain. Failed conservative therapy      Chief Complaint   Scheduled for left total knee      Review of Systems   Constitutional:  Negative.    Eye:  Negative.    Ear/Nose/Mouth/Throat:  Negative.    Respiratory:  Negative.    Cardiovascular:  Negative.    Gastrointestinal:  Negative.    Genitourinary:  Negative.    Hematology/Lymphatics:  Negative.    Endocrine:  Negative.    Immunologic:  Negative.    Musculoskeletal:  Joint pain.    Integumentary:  Negative.    Neurologic:  Negative.    Psychiatric:  Negative.    All other systems reviewed and negative      Health Status   Allergies:    Allergic Reactions (Selected)  No known allergies   Problem list:    All Problems  Obesity / SNOMED CT 9243798705 / Probable  Migraine / SNOMED CT 1435356434 / Confirmed  Kidney Stones / ICD-9-.0 / Confirmed  GERD (Gastroesophageal Reflux Disease) / ICD-9-.81 / Confirmed  DCIS (Ductal Carcinoma in Situ) of Breast / SNOMED CT 112539759 / Confirmed  Cataracts, bilateral / ICD-9-.9 / Confirmed  Inactive: Obesity / ICD-9-.00  Inactive: Migraine / ICD-9-.90  Inactive: Insomnia / ICD-9-.52  Inactive: Hoarseness / ICD-9-.49  Inactive: Hematuria / ICD-9-.70  Inactive: Erythema nodosum NOS / ICD-9-.2  Inactive: Cervical Polyp / ICD-9-.7  Resolved: Pregnancy / SNOMED CT 128930855  Resolved: Pregnancy / SNOMED CT 260066423  Canceled: Reflux / ICD-9-.81  Canceled: Obesity / ICD-9-.00  Canceled: gerd  Canceled: GERD   Medications:  (Selected)   Prescriptions  Prescribed  celecoxib 200 mg oral capsule: = 1 cap(s), Oral,  daily, PRN: AS NEEDED FOR PAIN, # 90 cap(s), 1 Refill(s), Type: Maintenance, Pharmacy: [facility], 1 cap(s) Oral daily,PRN:AS NEEDED FOR PAIN, 66, in, 19 8:50:00 CDT, Height Measured, 198, lb, 19 8:50:00 CDT, Weight M...  Documented Medications  Documented  Citrucel 500 mg oral tablet: = 2 tab(s) ( 1,000 mg ), Oral, daily, 0 Refill(s), Type: Maintenance  Fish Oil: Oral, 0 Refill(s), Type: Maintenance  Bertrand Colon Health: Oral, daily, 0 Refill(s), Type: Maintenance  Vitamin D3 2000 intl units oral tablet: ( 50 mcg ), Oral, daily, 0 Refill(s), Type: Maintenance  loratadine 10 mg oral tablet: = 1 tab(s) ( 10 mg ), Oral, daily, 0 Refill(s), Type: Maintenance  pantoprazole 40 mg oral delayed release tablet: = 1 tab(s) ( 40 mg ), PO, Daily, # 90 tab(s), 0 Refill(s), Type: Maintenance,    Medications          *denotes recorded medication          *Bertrand Colon Health: Oral, daily, 0 Refill(s).          celecoxib 200 mg oral capsule: 1 cap(s), Oral, daily, PRN: AS NEEDED FOR PAIN, 90 cap(s), 1 Refill(s).          *Vitamin D3 2000 intl units oral tablet: 50 mcg, Oral, daily, 0 Refill(s).          *loratadine 10 mg oral tablet: 10 mg, 1 tab(s), Oral, daily, 0 Refill(s).          *Citrucel 500 mg oral tablet: 1,000 mg, 2 tab(s), Oral, daily, 0 Refill(s).          *Fish Oil: Oral, 0 Refill(s).          *pantoprazole 40 mg oral delayed release tablet: 40 mg, 1 tab(s), PO, Daily, 90 tab(s), 0 Refill(s).          Histories   Past Medical History:    Active  Cataracts, bilateral (366.9)  Kidney Stones (592.0)  GERD (Gastroesophageal Reflux Disease) (530.81)  Migraine (5589055076)  DCIS (Ductal Carcinoma in Situ) of Breast (080622711)  Resolved  Pregnancy (250413186):  Resolved on 1981 at 30 years.  Pregnancy (182718270):  Resolved on 1982 at 31 years.   Family History:    Prostate carcinoma  Brother (Howard)  Diabetes mellitus  Sister  CA - Cancer  Mother ()  Father ()  Helicobacter  pylori  Mother ()  Colon polyp.  Brother (Howard)  Brother (Lui)     Procedure history:    Esophagogastroduodenoscopy (741656090) on 2020 at 70 Years.  Colonoscopy (140737469) on 2019 at 68 Years.  Esophagogastroduodenoscopy and biopsy (0479844494) on 2016 at 66 Years.  Colonoscopy on 2016 at 66 Years.  Comments:  2016 8:26 AM CDT - Whit Barcenas  Sedation:  Fentanyl, midazolam, Benadryl in divided dosages.   Impression: History of hemorrhoids with intermittent bleeding, currently inactive today.  Sigmoid diverticulosis, mild to moderate but asymptomatic.  Diminutive polyps at 70 cm, removed by cold biopsy technique.  About 8 mm polyp in the cecum, removed by hot snare techniqure.  Advanced polyp, 12 to 14 mm, of the ascending colon removed by hot snare technique.  Family history of  polyps.    Recommendation:  Repeat in 3 years.  Mastectomy (7891661122) on 2014 at 64 Years.  Comments:  2014 8:52 AM Joaquin Schulte MD  [facility]  Left Breast Lumpectomy on 2014 at 64 Years.  Comments:  2014 9:45 AM Joaquin Schulte MD, Dr. [facility]  Bilateral Breast Reconstruction in  at 64 Years.  Comments:  2014 9:12 AM Joaquin Mena MD, Dr.  2014  [facility] - [place], [place]  DEXA - Dual energy X-ray photon absorptiometry (162905611) on 2014 at 63 Years.  Comments:  2014 10:05 AM ISABEL - Favio SOLANO, Marely  Osteopenia-repeat in 2 years  Right Shoulder Arthroscopy and Rotator Cuff Repair in  at 63 Years.  Right Knee Arthroscopy in  at 62 Years.  Colonoscopy and extirpation of lesion of colon (732203044) on 2011 at 61 Years.  Comments:  2011 7:49 PM Jesse Suarez MD  No dysplasia/malignancy    2011 10:39 AM Tram Armstrong  Rectal polyp; hyperplastic changes, benign mucosal lymphoid aggregates    2011 10:02 AM CARL Mata Meeta FERMIN, Jesse Chaudhari  very small rectal polyps;   diverticulosis of the lower colon.  Normal TI  Follow up in 5 years  Cataract extraction and insertion of intraocular lens (3775621186) in 2011 at 61 Years.  Colonoscopy and extirpation of lesion of colon (773533001) on 1/15/2008 at 57 Years.  Comments:  11/30/2011 9:22 AM CARL Tim MD, Jesse  1 polyp removed;  diverticulosis   D & C, and polypectomy on 11/3/2006 at 56 Years.  Comments:  7/16/2010 10:20 AM CDT - Kaya Baptiste  For post menopausal bleeding.  Arthroscopy of knee (551257947) in the month of 9/2006 at 56 Years.  Comments:  5/28/2013 4:39 PM CDT - Viky Trujillo  Left  Flexible sigmoidoscopy (32134915) on 2/15/2002 at 51 Years.  Comments:  5/28/2013 4:43 PM CDT - iVky Trujillo  Normal to 40-cm.  Ganglion cyst (260880594).  Colonoscopy (467008924).  Comments:  11/15/2011 1:16 PM Joaquin Mena MD  four colonoscopies total   Social History:        Alcohol Assessment: Current            Very seldom      Tobacco Assessment: Denies Tobacco Use            Never      Substance Abuse Assessment: Denies Substance Abuse            Never      Employment and Education Assessment            Retired      Home and Environment Assessment            Marital status: .  Spouse/Partner name: Gopal.      Nutrition and Health Assessment            Type of diet: Regular.      Exercise and Physical Activity Assessment: Does not exercise      Sexual Assessment            Sexually active: Yes.  Number of current partners: 1.  Sexual orientation: Heterosexual.  Uses condoms: No.  ,        Alcohol Assessment: Current            Very seldom      Tobacco Assessment: Denies Tobacco Use            Never      Substance Abuse Assessment: Denies Substance Abuse            Never      Employment and Education Assessment            Retired      Home and Environment Assessment            Marital status: .  Spouse/Partner name: Gopal.      Nutrition and Health Assessment            Type of  diet: Regular.      Exercise and Physical Activity Assessment: Does not exercise      Sexual Assessment            Sexually active: Yes.  Number of current partners: 1.  Sexual orientation: Heterosexual.  Uses condoms: No.        Physical Examination   Vital Signs   10/14/2020 3:14 PM CDT Peripheral Pulse Rate 68 bpm    HR Method Electronic    Systolic Blood Pressure 147 mmHg  HI    Diastolic Blood Pressure 64 mmHg    Mean Arterial Pressure 92 mmHg    BP Method Electronic      Measurements from flowsheet : Measurements   10/14/2020 3:14 PM CDT   Weight Measured - Standard                214.6 lb     General:  Alert and oriented, No acute distress.    Eye:  Normal conjunctiva.    HENT:  Normocephalic, Tympanic membranes are clear, Oral mucosa is moist, No pharyngeal erythema.    Neck:  Supple, Non-tender, No carotid bruit, No jugular venous distention, No lymphadenopathy, No thyromegaly.    Respiratory:  Breath sounds are equal, Symmetrical chest wall expansion.         Respirations: Are within normal limits.         Pattern: Regular.         Breath sounds: Bilateral, Within normal limits.    Cardiovascular:  Normal rate, Regular rhythm, No murmur, Good pulses equal in all extremities, Normal peripheral perfusion, No edema.    Gastrointestinal:  Soft, Non-tender, Non-distended.    Musculoskeletal:  Normal range of motion, Normal strength, Normal gait, Positive Phalen.    Integumentary:  Warm, Dry, Intact, No rash.    Neurologic:  Alert, Oriented.    Psychiatric:  Cooperative, Appropriate mood & affect.       Review / Management   No family history of bleeding tendencies, thrombophilias, or anesthesia complications except for nausea  No personal history of bleeding tendencies, thrombophilias, anesthesia complications, or valvular disease.   ECG interpretation:  Normal sinus rhythm, No ST-T changes.       Impression and Plan   Diagnosis     Preop examination (GGJ39-IG Z01.818).     Osteoarthritis of left knee (SCN41-MW  M17.12).     Migraine (LZK34-CJ G43.709).     Pre-op exam (JTT12-AN Z01.818).     GERD (Gastroesophageal Reflux Disease) (SNQ98-WX K21.9).     Condition:  Stable, Proceed with procedure/surgery. ASA Class II..    Orders     No SBE prophylaxis or DVT prophylaxis necessary.     Informed patient to avoid aspirin and ibuprofen type products 10 days prior to surgery.     Stopping Celebrex this weekend. Will contact [facility] about Covid testing. Will get labs from [facility] performed earlier today.

## 2022-02-16 NOTE — TELEPHONE ENCOUNTER
---------------------  From: Bernadine Moser CMA   To: Joaquin Fay MD;     Sent: 10/21/2019 11:30:27 AM CDT  Subject: General Message     Phone Message    Note:   Pt called to say that quite a few years ago BRITTA prescribed celebrex for arthritis and inflammation..... she is having some inflammation and soreness in her knees and is wondering if she can get rx?  something that she doesn't have to take everyday but will help with the swelling.          Person Calling:  Monik  Phone number:  [phone]        PCP:   _    Time of Call:  _    Last office visit and reason:  _    Transferred to: _---------------------  From: Joaquin Fay MD   To: Bernadine Moser CMA;     Sent: 10/21/2019 12:27:17 PM CDT  Subject: RE: General Message     eRx sentPt contacted that rx is ready for  at the pharmacycorrection:   tried to contact pt to say that rx is at pharmacy..... no answer, no answering machine

## 2022-02-16 NOTE — PROGRESS NOTES
Patient:   MYRIAM ANDERSEN            MRN: [id number]            FIN: [id number]               Age:   66 years     Sex:  Female     :  1950   Associated Diagnoses:   Acute bronchitis   Author:   Joaquin Fay MD      Impression and Plan   Diagnosis     Acute bronchitis (QAJ93-DF J20.8).     Course:  Worsening.    Orders     Orders   Charges (Evaluation and Management):  77703 office outpatient visit 15 minutes (Charge) (Order): Quantity: 1, Acute bronchitis.     Orders (Selected)   Prescriptions  Prescribed  azithromycin 500 mg oral tablet: 1 tab(s) ( 500 mg ), PO, Daily, # 7 tab(s), 0 Refill(s), Type: Maintenance, Pharmacy: [facility], 1 tab(s) po daily,x7 day(s)  predniSONE 20 mg oral tablet: 1 tab(s) ( 20 mg ), PO, bid, # 14 tab(s), 0 Refill(s), Type: Maintenance, Pharmacy: [facility], 1 tab(s) po bid,x7 day(s).        Visit Information      Date of Service: 2017 01:32 pm  Performing Location: [facility]  Encounter#: [id number]      Primary Care Provider (PCP):  Joaquin Fay MD    NPI# [id number]   Visit type:  New symptom.    Accompanied by:  No one.    Source of history:  Self.    History limitation:  None.       Chief Complaint   Chief complaint discussed and confirmed correct.     2017 1:33 PM CDT    Pt here for cough        History of Present Illness             The patient presents with cough.  The cough is described as paroxysmal and productive yellow sputum.  The severity of the cough is severe.  The cough is worsening.  The cough has lasted for 1 week(s).  The context of the cough: occurred in association with illness.  There are no modifying factors.  Associated symptoms consist of nasal congestion, rhinorrhea, shortness of breath, denies chest pain, denies chills and denies fever.        Review of Systems   Constitutional:  Negative.    Eye:  Negative.    Ear/Nose/Mouth/Throat:  Negative.    Respiratory:  Cough.    Cardiovascular:  Negative.     Gastrointestinal:  Negative.    Genitourinary:  Negative.    Hematology/Lymphatics:  Negative.    Endocrine:  Negative.    Immunologic:  Negative.    Musculoskeletal:  Negative.    Integumentary:  Negative.    Neurologic:  Negative.    Psychiatric:  Negative.          All other systems reviewed and negative      Health Status   Allergies:    Allergic Reactions (Selected)  No known allergies   Medications:  (Selected)   Prescriptions  Prescribed  Imitrex 100 mg oral tablet: 1 tab(s) ( 100 mg ), po, once, # 9 tab(s), 1 Refill(s), Type: Soft Stop, Pharmacy: [facility], 1 tab(s) po once  Prevacid 30 mg oral delayed release capsule: 2 cap(s) ( 60 mg ), po, daily, # 60 EA, 11 Refill(s), Pharmacy: [facility], 2 cap(s) po daily  azithromycin 500 mg oral tablet: 1 tab(s) ( 500 mg ), PO, Daily, # 7 tab(s), 0 Refill(s), Type: Maintenance, Pharmacy: [facility], 1 tab(s) po daily,x7 day(s)  clindamycin 1% topical lotion: 1 haylie, TOP, BID, # 60 mL, 1 Refill(s), Type: Maintenance, Pharmacy: [facility], 1 haylie top bid  predniSONE 20 mg oral tablet: 1 tab(s) ( 20 mg ), PO, bid, # 14 tab(s), 0 Refill(s), Type: Maintenance, Pharmacy: [facility], 1 tab(s) po bid,x7 day(s)  triamcinolone 0.1% topical cream: 1 haylie, TOP, TID, # 60 g, 2 Refill(s), Type: Maintenance, Pharmacy: [id number], 1 haylie top tid   Problem list:    All Problems  Cataracts, bilateral / ICD-9-.9 / Confirmed  DCIS (Ductal Carcinoma in Situ) of Breast / SNOMED CT 604477339 / Confirmed  GERD (Gastroesophageal Reflux Disease) / ICD-9-.81 / Confirmed  Kidney Stones / ICD-9-.0 / Confirmed  Migraine / SNOMED CT 7429031332 / Confirmed  Obesity / SNOMED CT 4734545688 / Probable  Inactive: Cervical Polyp / ICD-9-.7  Inactive: Erythema nodosum NOS / ICD-9-.2  Inactive: Hematuria / ICD-9-.70  Inactive: Hoarseness / ICD-9-.49  Inactive: Insomnia / ICD-9-.52  Inactive: Migraine / ICD-9-CM  346.90  Inactive: Obesity / ICD-9-.00  Resolved: Pregnancy / SNOMED CT 450896228  Resolved: Pregnancy / SNOMED CT 748940758  Canceled: gerd  Canceled: GERD  Canceled: Obesity / ICD-9-.00  Canceled: Reflux / ICD-9-.81      Histories   Past Medical History:    Active  Cataracts, bilateral (366.9)  Kidney Stones (592.0)  GERD (Gastroesophageal Reflux Disease) (530.81)  Migraine (0876849919)  DCIS (Ductal Carcinoma in Situ) of Breast (044308076)  Resolved  Pregnancy (164247624):  Resolved on 1981 at 30 years.  Pregnancy (042308130):  Resolved on 1982 at 31 years.   Family History:    Prostate carcinoma  Brother (Howard)  Diabetes mellitus  Sister  CA - Cancer  Mother ()  Father ()  Helicobacter pylori  Mother ()  Colon polyp.  Brother (Howard)  Brother (Lui)     Procedure history:    Esophagogastroduodenoscopy and biopsy (SNOMED CT 8559273207) performed by Jesse Tim MD on 2016 at 66 Years.  Colonoscopy performed by Jesse Tim MD on 2016 at 66 Years.  Comments:  2016 8:26 AM - Whit Barcenas  Sedation:  Fentanyl, midazolam, Benadryl in divided dosages.   Impression: History of hemorrhoids with intermittent bleeding, currently inactive today.  Sigmoid diverticulosis, mild to moderate but asymptomatic.  Diminutive polyps at 70 cm, removed by cold biopsy technique.  About 8 mm polyp in the cecum, removed by hot snare techniqure.  Advanced polyp, 12 to 14 mm, of the ascending colon removed by hot snare technique.  Family history of  polyps.    Recommendation:  Repeat in 3 years.  Mastectomy (SNOMED CT 3548341281) performed by Martha Camarillo MD on 2014 at 64 Years.  Comments:  2014 8:52 Joaquin Shultz MD  Double Mastectomy with lymph node dissection  Dr. Camarillo  [facility]  Left Breast Lumpectomy performed by Frankie Rivera on 2014 at 64 Years.  Comments:  2014 9:45 Joaquin Shultz MD, Dr.  [facility]  Bilateral  Breast Reconstruction in 2014 at 64 Years.  Comments:  11/4/2014 9:12 AM - Joaquin Fay MD, Dr.  11/13/2014  [facility] [place]  DEXA - Dual energy X-ray photon absorptiometry (SNOMED CT 940142627) performed by Dani Javed DO on 5/20/2014 at 63 Years.  Comments:  5/28/2014 10:05 AM - Favio SOLANO, Marely  Osteopenia-repeat in 2 years  Right Shoulder Arthroscopy and Rotator Cuff Repair in 2013 at 63 Years.  Right Knee Arthroscopy in 2012 at 62 Years.  Colonoscopy and extirpation of lesion of colon (SNOMED CT 342357515) performed by Jesse Tim MD on 11/30/2011 at 61 Years.  Comments:  12/7/2011 7:49 PM - Jesse Tim MD  No dysplasia/malignancy    12/5/2011 10:39 AM - Tram Soto  Rectal polyp; hyperplastic changes, benign mucosal lymphoid aggregates    11/30/2011 10:02 AM - Jesse Tim MD  3 very small rectal polyps;   diverticulosis of the lower colon.  Normal TI  Follow up in 5 years  Cataract extraction and insertion of intraocular lens (SNOMED CT 2751702174) in 2011 at 61 Years.  Colonoscopy and extirpation of lesion of colon (SNOMED CT 965236481) performed by Jesse Tim MD on 1/15/2008 at 57 Years.  Comments:  11/30/2011 9:22 AM - Jesse Tim MD  1 polyp removed;  diverticulosis   D & C, and polypectomy on 11/3/2006 at 56 Years.  Comments:  7/16/2010 10:20 AM - Kaya Baptiste  For post menopausal bleeding.  Arthroscopy of knee (SNOMED CT 415867164) in the month of 9/2006 at 56 Years.  Comments:  5/28/2013 4:39 PM - Viky Trujillo  Left  Flexible sigmoidoscopy (SNOMED CT 46688681) performed by Joaquin Fay MD on 2/15/2002 at 51 Years.  Comments:  5/28/2013 4:43 PM - Viky Trujillo  Normal to 40-cm.  Ganglion cyst (SNOMED CT 226760621).  Colonoscopy (SNOMED CT 980773676).  Comments:  11/15/2011 1:16 PM - Joaquin Fay MD  four colonoscopies total   Social History:        Alcohol Assessment: Current            Very seldom      Tobacco Assessment: Denies Tobacco Use            Never       Substance Abuse Assessment: Denies Substance Abuse            Never      Employment and Education Assessment            Retired      Home and Environment Assessment            Marital status: .  Spouse/Partner name: Gopal.      Nutrition and Health Assessment            Type of diet: Regular.      Exercise and Physical Activity Assessment: Does not exercise      Sexual Assessment            Sexually active: Yes.  Number of current partners: 1.  Sexual orientation: Heterosexual.  Uses condoms: No.        Physical Examination   Vital signs reviewed  and within acceptable limits    Vital Signs   4/19/2017 1:33 PM CDT Temperature Tympanic 98.4 DegF    Peripheral Pulse Rate 86 bpm    Systolic Blood Pressure 122 mmHg    Diastolic Blood Pressure 80 mmHg    Mean Arterial Pressure 94 mmHg    BP Site Right arm    Oxygen Saturation 98 %      Measurements from flowsheet : Measurements   4/19/2017 1:33 PM CDT Height Measured - Standard 65 in    Weight Measured - Standard 205 lb    BSA 2.06 m2    Body Mass Index 34.11 kg/m2      General:  Alert and oriented, No acute distress.    Eye:  Pupils are equal, round and reactive to light, Extraocular movements are intact, Normal conjunctiva.    HENT:  Normocephalic, Tympanic membranes are clear, Normal hearing, Oral mucosa is moist, No pharyngeal erythema, No sinus tenderness.    Neck:  Supple, Non-tender, No lymphadenopathy, No thyromegaly.    Respiratory:  Lungs are clear to auscultation, Respirations are non-labored, Breath sounds are equal, demonstrates frequent loose cough, soft bilateral wheezes.    Cardiovascular:  Normal rate, Regular rhythm, No murmur, Normal peripheral perfusion, No edema.    Integumentary:  Warm, Dry, Pink.    Psychiatric:  Cooperative, Appropriate mood & affect, Normal judgment.

## 2022-02-16 NOTE — NURSING NOTE
Comprehensive Intake Entered On:  5/9/2019 8:58 AM CDT    Performed On:  5/9/2019 8:50 AM CDT by Ese Tolliver MA               Summary   Chief Complaint :   pt here for cough x 1 week    Menstrual Status :   Postmenopausal   Weight Measured :   198 lb(Converted to: 198 lb 0 oz, 89.81 kg)    Height Measured :   65 in(Converted to: 5 ft 5 in, 165.10 cm)    Body Mass Index :   32.95 kg/m2 (HI)    Body Surface Area :   2.03 m2   Systolic Blood Pressure :   124 mmHg   Diastolic Blood Pressure :   88 mmHg (HI)    Mean Arterial Pressure :   100 mmHg   Peripheral Pulse Rate :   76 bpm   BP Site :   Left arm   BP Method :   Manual   HR Method :   Manual   Temperature Tympanic :   98.9 DegF(Converted to: 37.2 DegC)    Oxygen Saturation :   96 %   Race :      Languages :   English   Ethnicity :   Not  or    Ese Tolliver MA - 5/9/2019 8:50 AM CDT   Health Status   Allergies Verified? :   Yes   Medication History Verified? :   Yes   Immunizations Current :   Yes   Medical History Verified? :   Yes   Pre-Visit Planning Status :   Completed   Tobacco Use? :   Never smoker   Ese Tolliver MA - 5/9/2019 8:50 AM CDT   Consents   Consent for Immunization Exchange :   Consent Granted   Consent for Immunizations to Providers :   Consent Granted   Ese Tolliver MA - 5/9/2019 8:50 AM CDT   Meds / Allergies   (As Of: 5/9/2019 8:58:27 AM CDT)   Allergies (Active)   No known allergies  Estimated Onset Date:   Unspecified ; Created By:   Kaya Baptiste; Reaction Status:   Active ; Category:   Drug ; Substance:   No known allergies ; Type:   Allergy ; Updated By:   Kaya Baptiste; Reviewed Date:   5/9/2019 8:53 AM CDT        Medication List   (As Of: 5/9/2019 8:58:27 AM CDT)   Prescription/Discharge Order    levoFLOXacin  :   levoFLOXacin ; Status:   Processing ; Ordered As Mnemonic:   Levaquin 500 mg oral tablet ; Ordering Provider:   Joaquin Fay MD; Action Display:   Complete ; Catalog Code:   levoFLOXacin  ; Order Dt/Tm:   5/9/2019 8:53:52 AM          SUMAtriptan  :   SUMAtriptan ; Status:   Prescribed ; Ordered As Mnemonic:   Imitrex 100 mg oral tablet ; Simple Display Line:   100 mg, 1 tab(s), po, once, 9 tab(s), 1 Refill(s) ; Ordering Provider:   Joaquin Fay MD; Catalog Code:   SUMAtriptan ; Order Dt/Tm:   4/20/2016 9:09:28 AM            Home Meds    latanoprost ophthalmic  :   latanoprost ophthalmic ; Status:   Documented ; Ordered As Mnemonic:   latanoprost ophthalmic ; Simple Display Line:   qpm, 0 Refill(s) ; Catalog Code:   latanoprost ophthalmic ; Order Dt/Tm:   10/29/2018 10:41:58 AM          pantoprazole  :   pantoprazole ; Status:   Documented ; Ordered As Mnemonic:   pantoprazole 40 mg oral delayed release tablet ; Simple Display Line:   40 mg, 1 tab(s), PO, Daily, 90 tab(s), 0 Refill(s) ; Catalog Code:   pantoprazole ; Order Dt/Tm:   10/29/2018 10:42:10 AM

## 2022-06-09 ENCOUNTER — LAB REQUISITION (OUTPATIENT)
Dept: LAB | Facility: CLINIC | Age: 72
End: 2022-06-09

## 2022-06-09 PROCEDURE — 87077 CULTURE AEROBIC IDENTIFY: CPT | Performed by: STUDENT IN AN ORGANIZED HEALTH CARE EDUCATION/TRAINING PROGRAM

## 2022-06-11 LAB — BACTERIA UR CULT: ABNORMAL

## 2023-02-07 ENCOUNTER — OFFICE VISIT (OUTPATIENT)
Dept: URBAN - METROPOLITAN AREA CLINIC 7 | Facility: CLINIC | Age: 73
End: 2023-02-07
Payer: MEDICARE

## 2023-02-07 DIAGNOSIS — H40.9 GLAUCOMA: ICD-10-CM

## 2023-02-07 DIAGNOSIS — H34.8320 TRIBUTARY (BRANCH) RETINAL VEIN OCCLUSION, LEFT EYE, WITH MACULAR EDEMA: Primary | ICD-10-CM

## 2023-02-07 PROCEDURE — 92134 CPTRZ OPH DX IMG PST SGM RTA: CPT | Performed by: OPHTHALMOLOGY

## 2023-02-07 PROCEDURE — 67028 INJECTION EYE DRUG: CPT | Performed by: OPHTHALMOLOGY

## 2023-02-07 PROCEDURE — 99204 OFFICE O/P NEW MOD 45 MIN: CPT | Performed by: OPHTHALMOLOGY

## 2023-02-07 ASSESSMENT — INTRAOCULAR PRESSURE
OS: 15
OD: 17

## 2023-02-07 NOTE — IMPRESSION/PLAN
Impression: Tributary (branch) retinal vein occlusion, left eye, with macular edema: Q30.3228. Plan: She has a history of BRVO OS and her last eylea OS was 11/1/22. OCT today shows no IRF/SRF OD and mild CME OS. We discussed the findings and natural history. Based on today's findings, I recommend treatment. Eylea injected OS without complication after discussing the R/IB/A in detail. She will see you in about 2 months. thanks Ed
RTC 1 year OCT OU; Ludmila Cruz Prior notes from other provider(s) have been reviewed in conjunction with today's visit.

## 2024-02-20 ENCOUNTER — OFFICE VISIT (OUTPATIENT)
Dept: URBAN - METROPOLITAN AREA CLINIC 7 | Facility: CLINIC | Age: 74
End: 2024-02-20
Payer: MEDICARE

## 2024-02-20 DIAGNOSIS — H34.8320 TRIBUTARY (BRANCH) RETINAL VEIN OCCLUSION, LEFT EYE, WITH MACULAR EDEMA: Primary | ICD-10-CM

## 2024-02-20 DIAGNOSIS — H40.9 GLAUCOMA: ICD-10-CM

## 2024-02-20 PROCEDURE — 92134 CPTRZ OPH DX IMG PST SGM RTA: CPT | Performed by: OPHTHALMOLOGY

## 2024-02-20 PROCEDURE — 67028 INJECTION EYE DRUG: CPT | Performed by: OPHTHALMOLOGY

## 2024-02-20 PROCEDURE — 92014 COMPRE OPH EXAM EST PT 1/>: CPT | Performed by: OPHTHALMOLOGY

## 2024-02-20 ASSESSMENT — INTRAOCULAR PRESSURE
OD: 16
OS: 13

## 2024-10-29 ENCOUNTER — LAB REQUISITION (OUTPATIENT)
Dept: LAB | Facility: CLINIC | Age: 74
End: 2024-10-29

## 2024-10-29 PROCEDURE — 82653 EL-1 FECAL QUANTITATIVE: CPT

## 2024-10-29 PROCEDURE — 83993 ASSAY FOR CALPROTECTIN FECAL: CPT

## 2024-10-30 LAB
CALPROTECTIN STL-MCNT: 309 MG/KG (ref 0–49.9)
ELASTASE PANC STL-MCNT: 591 UG/G

## 2025-05-28 ENCOUNTER — TRANSFERRED RECORDS (OUTPATIENT)
Dept: HEALTH INFORMATION MANAGEMENT | Facility: CLINIC | Age: 75
End: 2025-05-28

## 2025-06-05 ENCOUNTER — TRANSFERRED RECORDS (OUTPATIENT)
Dept: HEALTH INFORMATION MANAGEMENT | Facility: CLINIC | Age: 75
End: 2025-06-05

## 2025-06-10 ENCOUNTER — TRANSFERRED RECORDS (OUTPATIENT)
Dept: HEALTH INFORMATION MANAGEMENT | Facility: CLINIC | Age: 75
End: 2025-06-10
Payer: MEDICARE

## 2025-06-30 ENCOUNTER — TRANSFERRED RECORDS (OUTPATIENT)
Dept: HEALTH INFORMATION MANAGEMENT | Facility: CLINIC | Age: 75
End: 2025-06-30
Payer: MEDICARE

## 2025-07-02 ENCOUNTER — TRANSFERRED RECORDS (OUTPATIENT)
Dept: HEALTH INFORMATION MANAGEMENT | Facility: CLINIC | Age: 75
End: 2025-07-02
Payer: MEDICARE

## 2025-07-02 ENCOUNTER — MEDICAL CORRESPONDENCE (OUTPATIENT)
Dept: HEALTH INFORMATION MANAGEMENT | Facility: CLINIC | Age: 75
End: 2025-07-02
Payer: MEDICARE

## 2025-07-08 ENCOUNTER — PATIENT OUTREACH (OUTPATIENT)
Dept: ONCOLOGY | Facility: CLINIC | Age: 75
End: 2025-07-08
Payer: MEDICARE

## 2025-07-08 ENCOUNTER — TRANSCRIBE ORDERS (OUTPATIENT)
Dept: OTHER | Age: 75
End: 2025-07-08

## 2025-07-08 ENCOUNTER — PRE VISIT (OUTPATIENT)
Dept: ONCOLOGY | Facility: CLINIC | Age: 75
End: 2025-07-08
Payer: MEDICARE

## 2025-07-08 DIAGNOSIS — R91.8 LUNG NODULES: Primary | ICD-10-CM

## 2025-07-08 NOTE — PROGRESS NOTES
New Patient Oncology Nurse Navigator Note     Referring provider: Dr. Julianna Molina    Referring Clinic/Organization: Other - [facility]  Referred to: Thoracic Surgery  Requested provider (if applicable): Dr. Medellin  Referral Received: 07/08/25       Evaluation for :   Diagnosis   R91.8 (ICD-10-CM) - Lung nodules     Clinical History (per Nurse review of records provided):      06/30/2025 CT PET (Care Everywhere) showed:   IMPRESSION:   1. Pulmonary nodule left upper lobe measures 14 mm in diameter. This is not significantly changed compared to October 2021 where it measured up to 13 mm. This nodule is metabolically hyperactive measuring at the max of 9.9. In 2021, this measured SUV max of 4.4. The SUV activity does appear to have increased compared to prior. This is a mixed results. The increase in FDG activity is a concerning finding. However, the lack of significant change in size over the last 4 years is reassuring. If this is a malignancy, it is a slow-growing malignancy.   2. Fatty liver.     Clinical Assessment / Barriers to Care (Per Nurse):  History of breast cancer  Records Location: [facility], [facility] Everywhere  Records Needed: Outside CT and PET images,   Additional testing needed prior to consult: PFTs  Referral updates and Plan:     7/8: marked for records needed    THONY ShoemakerN, RN, OCN  [facility] Oncology Nurse Navigator  [phone] / [phone]

## 2025-07-08 NOTE — TELEPHONE ENCOUNTER
RECORDS STATUS - ALL OTHER DIAGNOSIS      Action July 10, 2025 11:53 AM ABT   Action Taken Records from [facility] received and sent to [facility] for upload and sent to  email for review.     RECORDS RECEIVED FROM:    DIAGNOSIS: Lung nodules [R91.8]    NOTES STATUS DETAILS   OFFICE NOTE from referring provider     OFFICE NOTE from medical oncologist     OFFICE NOTE from other specialist     DISCHARGE SUMMARY from hospital     DISCHARGE REPORT from the ER     OPERATIVE REPORT     MEDICATION LIST     LABS     PATHOLOGY REPORTS     ANYTHING RELATED TO DIAGNOSIS     PATHOLOGY [employer] TRACKING   Tracking #:   GENONOMIC TESTING     TYPE:     IMAGING (NEED IMAGES & REPORT)     CT SCANS [facility] 7/8 [facility]:  06/30/25: CT Chest    [facility]:  06/05/25, 05/30/24, 09/06/23, 09/07/22, 04/27/22: CT Chest    [facility]:  06/29/20, 07/11/19: CT Chest   MRI     X[facility]YS Re 7/8 [facility]:  01/07/22: XR Chest    [facility]:  06/29/20: XR Chest   ULTRASOUND     PET [facility] 7/8 [facility]:  06/30/25: PET CT     [facility]:  10/23/21: PET CT    IMAGE DISC [employer] TRACKING   [place] Tracking #: [id number]